# Patient Record
Sex: MALE | Race: WHITE | NOT HISPANIC OR LATINO | Employment: FULL TIME | URBAN - METROPOLITAN AREA
[De-identification: names, ages, dates, MRNs, and addresses within clinical notes are randomized per-mention and may not be internally consistent; named-entity substitution may affect disease eponyms.]

---

## 2020-06-13 ENCOUNTER — HOSPITAL ENCOUNTER (EMERGENCY)
Facility: CLINIC | Age: 25
Discharge: HOME | End: 2020-06-13
Attending: PHYSICIAN ASSISTANT | Admitting: PHYSICIAN ASSISTANT

## 2020-06-13 ENCOUNTER — APPOINTMENT (OUTPATIENT)
Dept: GENERAL RADIOLOGY | Facility: CLINIC | Age: 25
End: 2020-06-13
Attending: PHYSICIAN ASSISTANT

## 2020-06-13 VITALS
WEIGHT: 210 LBS | HEIGHT: 72 IN | HEART RATE: 85 BPM | BODY MASS INDEX: 28.44 KG/M2 | RESPIRATION RATE: 20 BRPM | TEMPERATURE: 98.5 F | OXYGEN SATURATION: 97 %

## 2020-06-13 DIAGNOSIS — L08.9 WOUND INFECTION: ICD-10-CM

## 2020-06-13 DIAGNOSIS — S61.412A LACERATION OF LEFT HAND: ICD-10-CM

## 2020-06-13 DIAGNOSIS — T14.8XXA WOUND INFECTION: ICD-10-CM

## 2020-06-13 PROCEDURE — 73130 X-RAY EXAM OF HAND: CPT | Mod: LT

## 2020-06-13 PROCEDURE — 99283 EMERGENCY DEPT VISIT LOW MDM: CPT | Mod: Z6 | Performed by: PHYSICIAN ASSISTANT

## 2020-06-13 PROCEDURE — 25000128 H RX IP 250 OP 636: Performed by: PHYSICIAN ASSISTANT

## 2020-06-13 PROCEDURE — 90471 IMMUNIZATION ADMIN: CPT

## 2020-06-13 PROCEDURE — 90715 TDAP VACCINE 7 YRS/> IM: CPT | Performed by: PHYSICIAN ASSISTANT

## 2020-06-13 PROCEDURE — 99284 EMERGENCY DEPT VISIT MOD MDM: CPT | Mod: 25

## 2020-06-13 RX ORDER — CEPHALEXIN 500 MG/1
500 CAPSULE ORAL 4 TIMES DAILY
Qty: 28 CAPSULE | Refills: 0 | Status: SHIPPED | OUTPATIENT
Start: 2020-06-13 | End: 2020-06-20

## 2020-06-13 RX ADMIN — CLOSTRIDIUM TETANI TOXOID ANTIGEN (FORMALDEHYDE INACTIVATED), CORYNEBACTERIUM DIPHTHERIAE TOXOID ANTIGEN (FORMALDEHYDE INACTIVATED), BORDETELLA PERTUSSIS TOXOID ANTIGEN (GLUTARALDEHYDE INACTIVATED), BORDETELLA PERTUSSIS FILAMENTOUS HEMAGGLUTININ ANTIGEN (FORMALDEHYDE INACTIVATED), BORDETELLA PERTUSSIS PERTACTIN ANTIGEN, AND BORDETELLA PERTUSSIS FIMBRIAE 2/3 ANTIGEN 0.5 ML: 5; 2; 2.5; 5; 3; 5 INJECTION, SUSPENSION INTRAMUSCULAR at 14:45

## 2020-06-13 ASSESSMENT — MIFFLIN-ST. JEOR: SCORE: 1980.55

## 2020-06-13 NOTE — ED PROVIDER NOTES
History     Chief Complaint   Patient presents with     Laceration     occured yesterday, painful grasp. needs tetanus update.     HPI  Maximilian Garner is a 25 year old male who presents with complaints of left hand laceration that occurred yesterday.  Patient states he accidentally struck himself with a metal part of the hand made axe yesterday afternoon, approximately 24 hours prior to arrival.  Patient states he thoroughly cleaned the wound and placed a butterfly bandage to bring the to wound edges together.  He is presenting today in order to get an updated tetanus.  He is also concerned as he has increased pain and swelling around the area as well as new surrounding redness and feels as if he has decreased strength with .  He has full range of motion of this index finger.  Denies fevers or chills.        Allergies:  No Known Allergies    Problem List:    There are no active problems to display for this patient.       Past Medical History:    No past medical history on file.    Past Surgical History:    No past surgical history on file.    Family History:    No family history on file.    Social History:  Marital Status:  Single [1]  Social History     Tobacco Use     Smoking status: Not on file   Substance Use Topics     Alcohol use: Not on file     Drug use: Not on file        Medications:    cephALEXin (KEFLEX) 500 MG capsule          Review of Systems   Constitutional: Negative.    Musculoskeletal:        Left hand pain and swelling   Skin: Positive for wound.   All other systems reviewed and are negative.      Physical Exam   Pulse: 85  Temp: 98.5  F (36.9  C)  Resp: 20  Height: 182.9 cm (6')  Weight: 95.3 kg (210 lb)  SpO2: 97 %      Physical Exam  Constitutional:       General: He is not in acute distress.     Appearance: He is well-developed. He is not ill-appearing, toxic-appearing or diaphoretic.   HENT:      Head: Normocephalic and atraumatic.   Eyes:      Conjunctiva/sclera: Conjunctivae normal.    Neck:      Musculoskeletal: Neck supple.   Cardiovascular:      Pulses: Normal pulses.   Pulmonary:      Effort: Pulmonary effort is normal.   Musculoskeletal: Normal range of motion.      Left wrist: Normal.      Left hand: He exhibits tenderness, bony tenderness, laceration and swelling. He exhibits normal range of motion, normal capillary refill and no deformity. Normal sensation noted. Normal strength noted.      Comments: ~1 cm linear laceration overlying left index finger MCP joint that is well approximated with a butterfly bandage in place.  There is slight surrounding erythema, swelling, and tenderness.  No purulent drainage.  Full ROM and strength of left index finger.  No streaking erythema.  Able to make a fist but describes pain with doing so.   Skin:     General: Skin is warm and dry.      Capillary Refill: Capillary refill takes less than 2 seconds.   Neurological:      Mental Status: He is alert.      Sensory: Sensation is intact.      Motor: Motor function is intact.         ED Course        Procedures    No results found for this or any previous visit (from the past 24 hour(s)).     Results for orders placed or performed during the hospital encounter of 06/13/20   XR Hand Left G/E 3 Views     Status: None    Narrative    EXAM: XR HAND LT G/E 3 VW  LOCATION: [facility]  DATE/TIME: 6/13/2020 3:24 PM    INDICATION: Index finger laceration one day prior.  COMPARISON: None.      Impression    IMPRESSION: No radiopaque foreign body. No fractures are evident. Normal alignment.       Medications   Tdap (tetanus-diphtheria-acell pertussis) (ADACEL) injection 0.5 mL (0.5 mLs Intramuscular Given 6/13/20 1445)       Assessments & Plan (with Medical Decision Making)     Pt is a 25 year old male who presents with complaints of left hand laceration that occurred yesterday.  Patient states he accidentally struck himself with a metal part of the hand made axe yesterday afternoon, approximately 24  hours prior to arrival.  Patient states he thoroughly cleaned the wound and placed a butterfly bandage to bring the to wound edges together.      Pt is afebrile on arrival.  Exam as above.  Tetanus was updated today.  Left hand x-rays were negative for fracture or foreign body.  Will allow wound to heal by secondary intention since it is about 24 hours since the injury.  Will place on antibiotics for concern over developing infection.  Return precautions were reviewed.  Hand-outs were provided.    Patient was sent with Keflex and was instructed to follow-up with PCP in 3-5 days for continued care and management or sooner if new or worsening symptoms.  He is to return to the ED for persistent and/or worsening symptoms.  Patient expressed understanding of the diagnosis and plan and was discharged home in good condition.    I have reviewed the nursing notes.    I have reviewed the findings, diagnosis, plan and need for follow up with the patient.    Discharge Medication List as of 6/13/2020  3:51 PM      START taking these medications    Details   cephALEXin (KEFLEX) 500 MG capsule Take 1 capsule (500 mg) by mouth 4 times daily for 7 days, Disp-28 capsule,R-0, E-Prescribe             Final diagnoses:   Laceration of left hand   Wound infection       6/13/2020   [facility] EMERGENCY DEPARTMENT      Disclaimer:  This note consists of symbols derived from keyboarding, dictation and/or voice recognition software.  As a result, there may be errors in the script that have gone undetected.  Please consider this when interpreting information found in this chart.     Fariha Chin PA-C  06/14/20 2203       Fariha Chin PA-C  06/14/20 2200

## 2020-06-13 NOTE — LETTER
June 17, 2020      To Whom It May Concern:      Maximilian Garner was seen in our Emergency Department on 6/13/20.  Patient may return to work.  Thank you.      Sincerely,        Fariha Chin PA-C

## 2020-06-13 NOTE — ED NOTES
Working with wood yesterday and sliced the top of his left hand. It has been cleaned and steri-stripped at home. But he needs tetanus.

## 2020-06-13 NOTE — ED AVS SNAPSHOT
[facility] Emergency Department  [address] [address]  Phone:  [phone]  Fax:  [phone]                                    Maximilian Garner   MRN: [id number]    Department:  [facility] Emergency Department   Date of Visit:  6/13/2020           After Visit Summary Signature Page    I have received my discharge instructions, and my questions have been answered. I have discussed any challenges I see with this plan with the nurse or doctor.    ..........................................................................................................................................  Patient/Patient Representative Signature      ..........................................................................................................................................  Patient Representative Print Name and Relationship to Patient    ..................................................               ................................................  Date                                   Time    ..........................................................................................................................................  Reviewed by Signature/Title    ...................................................              ..............................................  Date                                               Time          22EPIC Rev 08/18

## 2020-06-14 ASSESSMENT — ENCOUNTER SYMPTOMS
WOUND: 1
CONSTITUTIONAL NEGATIVE: 1